# Patient Record
Sex: MALE | Race: BLACK OR AFRICAN AMERICAN
[De-identification: names, ages, dates, MRNs, and addresses within clinical notes are randomized per-mention and may not be internally consistent; named-entity substitution may affect disease eponyms.]

---

## 2019-10-09 ENCOUNTER — HOSPITAL ENCOUNTER (EMERGENCY)
Dept: HOSPITAL 92 - ERS | Age: 64
Discharge: HOME | End: 2019-10-09
Payer: COMMERCIAL

## 2019-10-09 DIAGNOSIS — S16.1XXA: Primary | ICD-10-CM

## 2019-10-09 DIAGNOSIS — S39.012A: ICD-10-CM
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
## 2019-10-09 PROCEDURE — 72125 CT NECK SPINE W/O DYE: CPT

## 2019-10-09 PROCEDURE — 72100 X-RAY EXAM L-S SPINE 2/3 VWS: CPT

## 2019-10-09 NOTE — CT
CT Cervical Spine WO Con



History: Trauma. Neck and back pain



Comparison: None.



Findings: The occipital condyles are intact. Abnormal narrowing of the atlantodental interval, degene
rative in nature. Moderate degenerative disc space disease at C4-C6.



No acute fracture or malalignment of the cervical spine. No transverse process fracture. Visualized r
ibs are intact. Mild scarring both lung apices. Hypodensities left lobe of the thyroid for which

nonemergent ultrasound can be performed if clinically warranted.



Mild third spacing of fluid. No paraspinal muscle asymmetry.



Impression: No acute fracture or malalignment of the cervical spine.



Reported By: Jose Rivera 

Electronically Signed:  10/9/2019 6:19 PM

## 2019-10-09 NOTE — RAD
XR Lumbar Spine 2 Or 3 View



History: Neck and back pain



Comparison: Lumbar spine radiographs thousand 9



Findings: No acute fracture or malalignment. Low-grade L5/S1 disc space narrowing. Small anterior ost
eophytes lower lumbar spine.



No listhesis. Spinous processes are intact.



Numerous phleboliths in the pelvis.



Impression: No acute fracture or malalignment.



Reported By: Jose Rivera 

Electronically Signed:  10/9/2019 6:45 PM